# Patient Record
Sex: MALE | Race: WHITE | NOT HISPANIC OR LATINO | URBAN - METROPOLITAN AREA
[De-identification: names, ages, dates, MRNs, and addresses within clinical notes are randomized per-mention and may not be internally consistent; named-entity substitution may affect disease eponyms.]

---

## 2025-06-24 ENCOUNTER — OFFICE VISIT (OUTPATIENT)
Dept: URGENT CARE | Age: 45
End: 2025-06-24
Payer: COMMERCIAL

## 2025-06-24 VITALS
RESPIRATION RATE: 19 BRPM | DIASTOLIC BLOOD PRESSURE: 76 MMHG | OXYGEN SATURATION: 97 % | HEIGHT: 72 IN | WEIGHT: 195 LBS | SYSTOLIC BLOOD PRESSURE: 130 MMHG | BODY MASS INDEX: 26.41 KG/M2 | TEMPERATURE: 98 F | HEART RATE: 60 BPM

## 2025-06-24 DIAGNOSIS — H61.21 IMPACTED CERUMEN OF RIGHT EAR: Primary | ICD-10-CM

## 2025-06-24 ASSESSMENT — PATIENT HEALTH QUESTIONNAIRE - PHQ9
SUM OF ALL RESPONSES TO PHQ9 QUESTIONS 1 AND 2: 0
1. LITTLE INTEREST OR PLEASURE IN DOING THINGS: NOT AT ALL
2. FEELING DOWN, DEPRESSED OR HOPELESS: NOT AT ALL

## 2025-06-24 NOTE — PATIENT INSTRUCTIONS
Avoid using Q-tips or earbuds  Use a few drops of Debrox in each ear monthly as directed  Follow-up if symptoms return or ear pain occurs  May discontinue amoxicillin as no appearance of infection is present

## 2025-06-24 NOTE — PROGRESS NOTES
Subjective   Patient ID: Mayo Delong is a 44 y.o. male. They present today with a chief complaint of Earache (Right ear /Was at clinic this AM and they flushed the ear and gave him antibiotics /Wants a second opinion as he still feels fullness in the ear ).    History of Present Illness    Earache       Complaint of right-sided ear blockage and mild pain.  History of cerumen impaction in the past.  Patient states that he was seen at the [facility] this morning and had his right ear flushed out.  He states that they told him he he has a secondary otitis media and placed him on amoxicillin after stating the cerumen had been removed.  Patient states that he obtained a videoscope for his ear and says that the earwax looks the same as before he went.  He is returning for a second opinion.  No fevers or chills. No eye redness, discharge or itching.  No blood or discharge noted from ear.  No ear tenderness.  No nausea vomiting or diarrhea. No chest pain, shortness of breath or wheezing noted. No rashes or skin lesions noted. No known exposures to Mono, strep, pneumonia or influenza. Over-the-counter medications taken for symptoms. Nonsmoker.    Past Medical History  Allergies as of 06/24/2025    (No Known Allergies)       Prescriptions Prior to Admission[1]     Medical History[2]    Surgical History[3]     reports that he has never smoked. He has never been exposed to tobacco smoke. He has never used smokeless tobacco.    Review of Systems  Review of Systems   HENT:  Positive for ear pain.         As in history of present illness                          Objective    Vitals:    06/24/25 1854   BP: 130/76   Pulse: 60   Resp: 19   Temp: 36.7 °C (98 °F)   SpO2: 97%   Weight: 88.5 kg (195 lb)   Height: 1.829 m (6')     No LMP for male patient.    Physical Exam  Gen- A&O. NAD at rest.   Ears-left ear canal clear with normal-appearing tympanic membrane.  Right ear canal shows cerumen obstructing view.  Following irrigation  ear canal is clear with a normal-appearing tympanic membrane.  OP-no erythema or exudates. Some mucous in posterior OP   Neck- mild anterior lymphadenopathy  Lungs- clear to auscultation without wheezes or rhonchi  Skin-no rashes, hives or lesions  Ear Cerumen Removal    Date/Time: 6/24/2025 7:15 PM    Performed by: Dawit Cervantes MD  Authorized by: Dawit Cervantes MD    Consent:     Consent obtained:  Verbal    Consent given by:  Patient    Risks, benefits, and alternatives were discussed: yes      Risks discussed:  Bleeding, infection, pain and dizziness  Universal protocol:     Patient identity confirmed:  Verbally with patient  Procedure details:     Location:  R ear    Procedure type: irrigation      Procedure outcomes: cerumen removed    Post-procedure details:     Inspection:  No bleeding, ear canal clear and TM intact    Hearing quality:  Improved    Procedure completion:  Tolerated      Point of Care Test & Imaging Results from this visit  No results found for this visit on 06/24/25.   Imaging  No results found.    Cardiology, Vascular, and Other Imaging  No other imaging results found for the past 2 days      Diagnostic study results (if any) were reviewed by Dawit Cervantes MD.    Assessment/Plan   Allergies, medications, history, and pertinent labs/EKGs/Imaging reviewed by Dawit Cervantes MD.     Medical Decision Making  At time of discharge patient was clinically well-appearing and HDS for outpatient management. The patient and/or family was educated regarding diagnosis, supportive care, OTC and Rx medications. The patient and/or family was given the opportunity to ask questions prior to discharge.  They verbalized understanding of my discussion of the plans for treatment, expected course, indications to return to  or seek further evaluation in ED, and the need for timely follow up as directed.   They were provided with a work/school excuse if requested.    Orders and Diagnoses  Diagnoses and all orders for  this visit:  Impacted cerumen of right ear  Other orders  -     Ear Cerumen Removal      Medical Admin Record      Patient disposition: Home    Electronically signed by Dawit Cervantes MD  7:17 PM           [1] (Not in a hospital admission)   [2]   Past Medical History:  Diagnosis Date    Unspecified otitis externa, right ear 07/31/2020    Otitis externa of right ear, unspecified chronicity, unspecified type    Unspecified otitis externa, unspecified ear 07/30/2020    Otitis externa   [3] No past surgical history on file.